# Patient Record
Sex: FEMALE | Race: WHITE | URBAN - METROPOLITAN AREA
[De-identification: names, ages, dates, MRNs, and addresses within clinical notes are randomized per-mention and may not be internally consistent; named-entity substitution may affect disease eponyms.]

---

## 2017-07-18 ENCOUNTER — OUTPATIENT (OUTPATIENT)
Dept: OUTPATIENT SERVICES | Facility: HOSPITAL | Age: 72
LOS: 1 days | Discharge: HOME | End: 2017-07-18
Payer: MEDICARE

## 2017-07-18 VITALS
RESPIRATION RATE: 14 BRPM | TEMPERATURE: 98 F | WEIGHT: 190.92 LBS | HEIGHT: 63 IN | DIASTOLIC BLOOD PRESSURE: 62 MMHG | SYSTOLIC BLOOD PRESSURE: 112 MMHG | HEART RATE: 70 BPM | OXYGEN SATURATION: 100 %

## 2017-07-18 DIAGNOSIS — Z01.818 ENCOUNTER FOR OTHER PREPROCEDURAL EXAMINATION: ICD-10-CM

## 2017-07-18 DIAGNOSIS — M48.06 SPINAL STENOSIS, LUMBAR REGION: ICD-10-CM

## 2017-07-18 DIAGNOSIS — M41.86 OTHER FORMS OF SCOLIOSIS, LUMBAR REGION: ICD-10-CM

## 2017-07-18 DIAGNOSIS — Z98.890 OTHER SPECIFIED POSTPROCEDURAL STATES: Chronic | ICD-10-CM

## 2017-07-18 DIAGNOSIS — G62.9 POLYNEUROPATHY, UNSPECIFIED: ICD-10-CM

## 2017-07-18 DIAGNOSIS — E89.0 POSTPROCEDURAL HYPOTHYROIDISM: Chronic | ICD-10-CM

## 2017-07-18 DIAGNOSIS — M41.9 SCOLIOSIS, UNSPECIFIED: ICD-10-CM

## 2017-07-18 DIAGNOSIS — Z98.49 CATARACT EXTRACTION STATUS, UNSPECIFIED EYE: Chronic | ICD-10-CM

## 2017-07-18 DIAGNOSIS — K21.9 GASTRO-ESOPHAGEAL REFLUX DISEASE WITHOUT ESOPHAGITIS: ICD-10-CM

## 2017-07-18 DIAGNOSIS — K76.0 FATTY (CHANGE OF) LIVER, NOT ELSEWHERE CLASSIFIED: ICD-10-CM

## 2017-07-18 DIAGNOSIS — Z92.241 PERSONAL HISTORY OF SYSTEMIC STEROID THERAPY: Chronic | ICD-10-CM

## 2017-07-18 DIAGNOSIS — K44.9 DIAPHRAGMATIC HERNIA WITHOUT OBSTRUCTION OR GANGRENE: ICD-10-CM

## 2017-07-18 DIAGNOSIS — M51.36 OTHER INTERVERTEBRAL DISC DEGENERATION, LUMBAR REGION: ICD-10-CM

## 2017-07-18 DIAGNOSIS — M43.16 SPONDYLOLISTHESIS, LUMBAR REGION: ICD-10-CM

## 2017-07-18 LAB
ALBUMIN SERPL ELPH-MCNC: 4.4 G/DL — SIGNIFICANT CHANGE UP (ref 3.3–5)
ALLERGY+IMMUNOLOGY DIAG STUDY NOTE: SIGNIFICANT CHANGE UP
ALP SERPL-CCNC: 92 U/L — SIGNIFICANT CHANGE UP (ref 40–120)
ALT FLD-CCNC: 30 U/L — SIGNIFICANT CHANGE UP (ref 12–78)
ANION GAP SERPL CALC-SCNC: 8 MMOL/L — SIGNIFICANT CHANGE UP (ref 5–17)
ANTIBODY INTERPRETATION 2: SIGNIFICANT CHANGE UP
APPEARANCE UR: CLEAR — SIGNIFICANT CHANGE UP
APTT BLD: 30.4 SEC — SIGNIFICANT CHANGE UP (ref 27.5–37.4)
AST SERPL-CCNC: 24 U/L — SIGNIFICANT CHANGE UP (ref 15–37)
BACTERIA # UR AUTO: (no result)
BASOPHILS # BLD AUTO: 0.1 K/UL — SIGNIFICANT CHANGE UP (ref 0–0.2)
BASOPHILS NFR BLD AUTO: 1.2 % — SIGNIFICANT CHANGE UP (ref 0–2)
BILIRUB SERPL-MCNC: 0.4 MG/DL — SIGNIFICANT CHANGE UP (ref 0.2–1.2)
BILIRUB UR-MCNC: NEGATIVE — SIGNIFICANT CHANGE UP
BLD GP AB SCN SERPL QL: (no result)
BUN SERPL-MCNC: 16 MG/DL — SIGNIFICANT CHANGE UP (ref 7–23)
CALCIUM SERPL-MCNC: 10.1 MG/DL — SIGNIFICANT CHANGE UP (ref 8.5–10.1)
CHLORIDE SERPL-SCNC: 106 MMOL/L — SIGNIFICANT CHANGE UP (ref 96–108)
CO2 SERPL-SCNC: 26 MMOL/L — SIGNIFICANT CHANGE UP (ref 22–31)
COLOR SPEC: YELLOW — SIGNIFICANT CHANGE UP
CREAT SERPL-MCNC: 0.98 MG/DL — SIGNIFICANT CHANGE UP (ref 0.5–1.3)
DIFF PNL FLD: NEGATIVE — SIGNIFICANT CHANGE UP
DIR ANTIGLOB POLYSPECIFIC INTERPRETATION: SIGNIFICANT CHANGE UP
EOSINOPHIL # BLD AUTO: 0.2 K/UL — SIGNIFICANT CHANGE UP (ref 0–0.5)
EOSINOPHIL NFR BLD AUTO: 2 % — SIGNIFICANT CHANGE UP (ref 0–6)
EPI CELLS # UR: SIGNIFICANT CHANGE UP
GLUCOSE SERPL-MCNC: 85 MG/DL — SIGNIFICANT CHANGE UP (ref 70–99)
GLUCOSE UR QL: NEGATIVE MG/DL — SIGNIFICANT CHANGE UP
HCT VFR BLD CALC: 41.7 % — SIGNIFICANT CHANGE UP (ref 34.5–45)
HGB BLD-MCNC: 14.7 G/DL — SIGNIFICANT CHANGE UP (ref 11.5–15.5)
INR BLD: 1 RATIO — SIGNIFICANT CHANGE UP (ref 0.88–1.16)
KETONES UR-MCNC: NEGATIVE — SIGNIFICANT CHANGE UP
LEUKOCYTE ESTERASE UR-ACNC: (no result)
LYMPHOCYTES # BLD AUTO: 3.2 K/UL — SIGNIFICANT CHANGE UP (ref 1–3.3)
LYMPHOCYTES # BLD AUTO: 32.7 % — SIGNIFICANT CHANGE UP (ref 13–44)
MCHC RBC-ENTMCNC: 32.2 PG — SIGNIFICANT CHANGE UP (ref 27–34)
MCHC RBC-ENTMCNC: 35.2 GM/DL — SIGNIFICANT CHANGE UP (ref 32–36)
MCV RBC AUTO: 91.5 FL — SIGNIFICANT CHANGE UP (ref 80–100)
MONOCYTES # BLD AUTO: 0.6 K/UL — SIGNIFICANT CHANGE UP (ref 0–0.9)
MONOCYTES NFR BLD AUTO: 5.8 % — SIGNIFICANT CHANGE UP (ref 2–14)
NEUTROPHILS # BLD AUTO: 5.6 K/UL — SIGNIFICANT CHANGE UP (ref 1.8–7.4)
NEUTROPHILS NFR BLD AUTO: 58.3 % — SIGNIFICANT CHANGE UP (ref 43–77)
NITRITE UR-MCNC: NEGATIVE — SIGNIFICANT CHANGE UP
PH UR: 5 — SIGNIFICANT CHANGE UP (ref 5–8)
PLATELET # BLD AUTO: 258 K/UL — SIGNIFICANT CHANGE UP (ref 150–400)
POTASSIUM SERPL-MCNC: 4.2 MMOL/L — SIGNIFICANT CHANGE UP (ref 3.5–5.3)
POTASSIUM SERPL-SCNC: 4.2 MMOL/L — SIGNIFICANT CHANGE UP (ref 3.5–5.3)
PROT SERPL-MCNC: 7.6 GM/DL — SIGNIFICANT CHANGE UP (ref 6–8.3)
PROT UR-MCNC: NEGATIVE MG/DL — SIGNIFICANT CHANGE UP
PROTHROM AB SERPL-ACNC: 10.8 SEC — SIGNIFICANT CHANGE UP (ref 9.8–12.7)
RBC # BLD: 4.56 M/UL — SIGNIFICANT CHANGE UP (ref 3.8–5.2)
RBC # FLD: 11 % — SIGNIFICANT CHANGE UP (ref 10.3–14.5)
RBC CASTS # UR COMP ASSIST: SIGNIFICANT CHANGE UP /HPF (ref 0–4)
SODIUM SERPL-SCNC: 140 MMOL/L — SIGNIFICANT CHANGE UP (ref 135–145)
SP GR SPEC: 1.02 — SIGNIFICANT CHANGE UP (ref 1.01–1.02)
TYPE + AB SCN PNL BLD: SIGNIFICANT CHANGE UP
UROBILINOGEN FLD QL: NEGATIVE MG/DL — SIGNIFICANT CHANGE UP
WBC # BLD: 9.7 K/UL — SIGNIFICANT CHANGE UP (ref 3.8–10.5)
WBC # FLD AUTO: 9.7 K/UL — SIGNIFICANT CHANGE UP (ref 3.8–10.5)
WBC UR QL: SIGNIFICANT CHANGE UP

## 2017-07-18 PROCEDURE — 86077 PHYS BLOOD BANK SERV XMATCH: CPT

## 2017-07-18 PROCEDURE — 93010 ELECTROCARDIOGRAM REPORT: CPT

## 2017-07-18 PROCEDURE — 71020: CPT | Mod: 26

## 2017-07-18 NOTE — H&P PST ADULT - PSH
S/P cataract extraction  B/L; 3/2017  S/P colonoscopy  usnure of all dates; last one 2014  S/P dilatation and curettage  2005  S/P endoscopy  usnure of all dates; last one 2016  S/P epidural steroid injection  2014, 2016  S/P partial thyroidectomy  1996

## 2017-07-18 NOTE — H&P PST ADULT - ASSESSMENT
This is a  52y /o  Female who presents to [facility] prior to proposed procedure with Dr. Atkins for L2-L5 laminectomies with posterolateral fusion possible instrumentation local bone graft.      1. Labs, MRSA swab, UA: per Dr. Atkins.  2. CXR to be reviewed by Radiology.  3. EKG to be reviewed by Cardiology.  4. Clearance with Dr. Oliva as scheduled, # [phone].  5. EZ sponges, Mupirocin ointment, day of procedure instructions provided and reviewed with patient.

## 2017-07-18 NOTE — H&P PST ADULT - HISTORY OF PRESENT ILLNESS
This is a  52y /o  Female who presents to [facility] prior to proposed procedure with Dr. Atkins for L2-L5 laminectomies with posterolateral fusion possible instrumentation local bone graft.  Reports lower back pain for many years and worsening since 2014. Reports has been having urgency of urination and bowels occasionally. Denies any urinary or fecal incontinence. Seen by Dr. Atkins with MRI of lower some which demonstrated sever spondylolisthesis, and spinal stenosis. Reports cramping of legs on and off and worse at night. Reports symptoms are decreased with Neurontin and Oxycodone as directed. Evaluated by Dr. Atkins and now presents for said procedure.

## 2017-07-19 LAB
HBA1C BLD-MCNC: 5.3 % — SIGNIFICANT CHANGE UP (ref 4–5.6)
MRSA PCR RESULT.: SIGNIFICANT CHANGE UP
S AUREUS DNA NOSE QL NAA+PROBE: SIGNIFICANT CHANGE UP

## 2017-07-26 LAB — ABO RH CONFIRMATION: SIGNIFICANT CHANGE UP

## 2017-07-26 RX ORDER — HYDROMORPHONE HYDROCHLORIDE 2 MG/ML
0.5 INJECTION INTRAMUSCULAR; INTRAVENOUS; SUBCUTANEOUS
Qty: 0 | Refills: 0 | Status: DISCONTINUED | OUTPATIENT
Start: 2017-07-27 | End: 2017-07-29

## 2017-07-26 RX ORDER — HYDROMORPHONE HYDROCHLORIDE 2 MG/ML
30 INJECTION INTRAMUSCULAR; INTRAVENOUS; SUBCUTANEOUS
Qty: 0 | Refills: 0 | Status: DISCONTINUED | OUTPATIENT
Start: 2017-07-27 | End: 2017-07-29

## 2017-07-26 RX ORDER — NALOXONE HYDROCHLORIDE 4 MG/.1ML
0.1 SPRAY NASAL
Qty: 0 | Refills: 0 | Status: DISCONTINUED | OUTPATIENT
Start: 2017-07-27 | End: 2017-07-31

## 2017-07-27 ENCOUNTER — INPATIENT (INPATIENT)
Facility: HOSPITAL | Age: 72
LOS: 3 days | Discharge: HOME | End: 2017-07-31
Attending: ORTHOPAEDIC SURGERY | Admitting: ORTHOPAEDIC SURGERY

## 2017-07-27 VITALS
TEMPERATURE: 98 F | DIASTOLIC BLOOD PRESSURE: 78 MMHG | WEIGHT: 190.48 LBS | RESPIRATION RATE: 16 BRPM | HEART RATE: 84 BPM | OXYGEN SATURATION: 97 % | SYSTOLIC BLOOD PRESSURE: 150 MMHG | HEIGHT: 63 IN

## 2017-07-27 DIAGNOSIS — Z98.890 OTHER SPECIFIED POSTPROCEDURAL STATES: Chronic | ICD-10-CM

## 2017-07-27 DIAGNOSIS — Z98.49 CATARACT EXTRACTION STATUS, UNSPECIFIED EYE: Chronic | ICD-10-CM

## 2017-07-27 DIAGNOSIS — E89.0 POSTPROCEDURAL HYPOTHYROIDISM: Chronic | ICD-10-CM

## 2017-07-27 DIAGNOSIS — Z92.241 PERSONAL HISTORY OF SYSTEMIC STEROID THERAPY: Chronic | ICD-10-CM

## 2017-07-27 LAB
ALLERGY+IMMUNOLOGY DIAG STUDY NOTE: (no result)
ANION GAP SERPL CALC-SCNC: 6 MMOL/L — SIGNIFICANT CHANGE UP (ref 5–17)
BASOPHILS # BLD AUTO: 0 K/UL — SIGNIFICANT CHANGE UP (ref 0–0.2)
BASOPHILS NFR BLD AUTO: 0.3 % — SIGNIFICANT CHANGE UP (ref 0–2)
BUN SERPL-MCNC: 11 MG/DL — SIGNIFICANT CHANGE UP (ref 7–23)
CALCIUM SERPL-MCNC: 9 MG/DL — SIGNIFICANT CHANGE UP (ref 8.5–10.1)
CHLORIDE SERPL-SCNC: 108 MMOL/L — SIGNIFICANT CHANGE UP (ref 96–108)
CO2 SERPL-SCNC: 27 MMOL/L — SIGNIFICANT CHANGE UP (ref 22–31)
CREAT SERPL-MCNC: 0.78 MG/DL — SIGNIFICANT CHANGE UP (ref 0.5–1.3)
EOSINOPHIL # BLD AUTO: 0 K/UL — SIGNIFICANT CHANGE UP (ref 0–0.5)
EOSINOPHIL NFR BLD AUTO: 0.3 % — SIGNIFICANT CHANGE UP (ref 0–6)
GLUCOSE SERPL-MCNC: 122 MG/DL — HIGH (ref 70–99)
HCT VFR BLD CALC: 38.7 % — SIGNIFICANT CHANGE UP (ref 34.5–45)
HGB BLD-MCNC: 13.4 G/DL — SIGNIFICANT CHANGE UP (ref 11.5–15.5)
LYMPHOCYTES # BLD AUTO: 1.4 K/UL — SIGNIFICANT CHANGE UP (ref 1–3.3)
LYMPHOCYTES # BLD AUTO: 12.3 % — LOW (ref 13–44)
MCHC RBC-ENTMCNC: 32.1 PG — SIGNIFICANT CHANGE UP (ref 27–34)
MCHC RBC-ENTMCNC: 34.8 GM/DL — SIGNIFICANT CHANGE UP (ref 32–36)
MCV RBC AUTO: 92.4 FL — SIGNIFICANT CHANGE UP (ref 80–100)
MONOCYTES # BLD AUTO: 0.3 K/UL — SIGNIFICANT CHANGE UP (ref 0–0.9)
MONOCYTES NFR BLD AUTO: 2.1 % — SIGNIFICANT CHANGE UP (ref 2–14)
NEUTROPHILS # BLD AUTO: 10 K/UL — HIGH (ref 1.8–7.4)
NEUTROPHILS NFR BLD AUTO: 85 % — HIGH (ref 43–77)
PLATELET # BLD AUTO: 230 K/UL — SIGNIFICANT CHANGE UP (ref 150–400)
POTASSIUM SERPL-MCNC: 4.1 MMOL/L — SIGNIFICANT CHANGE UP (ref 3.5–5.3)
POTASSIUM SERPL-SCNC: 4.1 MMOL/L — SIGNIFICANT CHANGE UP (ref 3.5–5.3)
RBC # BLD: 4.19 M/UL — SIGNIFICANT CHANGE UP (ref 3.8–5.2)
RBC # FLD: 11.2 % — SIGNIFICANT CHANGE UP (ref 10.3–14.5)
SODIUM SERPL-SCNC: 141 MMOL/L — SIGNIFICANT CHANGE UP (ref 135–145)
WBC # BLD: 11.8 K/UL — HIGH (ref 3.8–10.5)
WBC # FLD AUTO: 11.8 K/UL — HIGH (ref 3.8–10.5)

## 2017-07-27 RX ORDER — SIMVASTATIN 20 MG/1
20 TABLET, FILM COATED ORAL AT BEDTIME
Qty: 0 | Refills: 0 | Status: DISCONTINUED | OUTPATIENT
Start: 2017-07-27 | End: 2017-07-31

## 2017-07-27 RX ORDER — GABAPENTIN 400 MG/1
200 CAPSULE ORAL AT BEDTIME
Qty: 0 | Refills: 0 | Status: DISCONTINUED | OUTPATIENT
Start: 2017-07-27 | End: 2017-07-31

## 2017-07-27 RX ORDER — CHOLECALCIFEROL (VITAMIN D3) 125 MCG
1 CAPSULE ORAL
Qty: 0 | Refills: 0 | COMMUNITY

## 2017-07-27 RX ORDER — ONDANSETRON 8 MG/1
4 TABLET, FILM COATED ORAL EVERY 6 HOURS
Qty: 0 | Refills: 0 | Status: DISCONTINUED | OUTPATIENT
Start: 2017-07-27 | End: 2017-07-31

## 2017-07-27 RX ORDER — ONDANSETRON 8 MG/1
4 TABLET, FILM COATED ORAL ONCE
Qty: 0 | Refills: 0 | Status: DISCONTINUED | OUTPATIENT
Start: 2017-07-27 | End: 2017-07-27

## 2017-07-27 RX ORDER — MAGNESIUM OXIDE 400 MG ORAL TABLET 241.3 MG
1 TABLET ORAL
Qty: 0 | Refills: 0 | COMMUNITY

## 2017-07-27 RX ORDER — ONDANSETRON 8 MG/1
4 TABLET, FILM COATED ORAL EVERY 6 HOURS
Qty: 0 | Refills: 0 | Status: DISCONTINUED | OUTPATIENT
Start: 2017-07-27 | End: 2017-07-27

## 2017-07-27 RX ORDER — ACETAMINOPHEN 500 MG
1000 TABLET ORAL ONCE
Qty: 0 | Refills: 0 | Status: COMPLETED | OUTPATIENT
Start: 2017-07-27 | End: 2017-07-27

## 2017-07-27 RX ORDER — LEVOTHYROXINE SODIUM 125 MCG
1 TABLET ORAL
Qty: 0 | Refills: 0 | COMMUNITY

## 2017-07-27 RX ORDER — MAGNESIUM OXIDE 400 MG ORAL TABLET 241.3 MG
400 TABLET ORAL DAILY
Qty: 0 | Refills: 0 | Status: DISCONTINUED | OUTPATIENT
Start: 2017-07-27 | End: 2017-07-31

## 2017-07-27 RX ORDER — GABAPENTIN 400 MG/1
3 CAPSULE ORAL
Qty: 0 | Refills: 0 | COMMUNITY

## 2017-07-27 RX ORDER — RANITIDINE HYDROCHLORIDE 150 MG/1
1 TABLET, FILM COATED ORAL
Qty: 0 | Refills: 0 | COMMUNITY

## 2017-07-27 RX ORDER — SODIUM CHLORIDE 9 MG/ML
1000 INJECTION INTRAMUSCULAR; INTRAVENOUS; SUBCUTANEOUS
Qty: 0 | Refills: 0 | Status: DISCONTINUED | OUTPATIENT
Start: 2017-07-27 | End: 2017-07-27

## 2017-07-27 RX ORDER — FAMOTIDINE 10 MG/ML
20 INJECTION INTRAVENOUS DAILY
Qty: 0 | Refills: 0 | Status: DISCONTINUED | OUTPATIENT
Start: 2017-07-27 | End: 2017-07-28

## 2017-07-27 RX ORDER — CEFAZOLIN SODIUM 1 G
2000 VIAL (EA) INJECTION EVERY 8 HOURS
Qty: 0 | Refills: 0 | Status: COMPLETED | OUTPATIENT
Start: 2017-07-27 | End: 2017-07-28

## 2017-07-27 RX ORDER — DOCUSATE SODIUM 100 MG
100 CAPSULE ORAL THREE TIMES A DAY
Qty: 0 | Refills: 0 | Status: DISCONTINUED | OUTPATIENT
Start: 2017-07-27 | End: 2017-07-31

## 2017-07-27 RX ORDER — SODIUM CHLORIDE 9 MG/ML
1000 INJECTION, SOLUTION INTRAVENOUS
Qty: 0 | Refills: 0 | Status: DISCONTINUED | OUTPATIENT
Start: 2017-07-27 | End: 2017-07-31

## 2017-07-27 RX ORDER — GABAPENTIN 400 MG/1
300 CAPSULE ORAL
Qty: 0 | Refills: 0 | Status: DISCONTINUED | OUTPATIENT
Start: 2017-07-27 | End: 2017-07-27

## 2017-07-27 RX ORDER — OXYCODONE HYDROCHLORIDE 5 MG/1
1 TABLET ORAL
Qty: 0 | Refills: 0 | COMMUNITY

## 2017-07-27 RX ORDER — ACETAMINOPHEN 500 MG
325 TABLET ORAL EVERY 4 HOURS
Qty: 0 | Refills: 0 | Status: DISCONTINUED | OUTPATIENT
Start: 2017-07-27 | End: 2017-07-31

## 2017-07-27 RX ORDER — SIMVASTATIN 20 MG/1
1 TABLET, FILM COATED ORAL
Qty: 0 | Refills: 0 | COMMUNITY

## 2017-07-27 RX ORDER — LEVOTHYROXINE SODIUM 125 MCG
100 TABLET ORAL DAILY
Qty: 0 | Refills: 0 | Status: DISCONTINUED | OUTPATIENT
Start: 2017-07-27 | End: 2017-07-31

## 2017-07-27 RX ADMIN — ONDANSETRON 4 MILLIGRAM(S): 8 TABLET, FILM COATED ORAL at 21:32

## 2017-07-27 RX ADMIN — HYDROMORPHONE HYDROCHLORIDE 30 MILLILITER(S): 2 INJECTION INTRAMUSCULAR; INTRAVENOUS; SUBCUTANEOUS at 13:17

## 2017-07-27 RX ADMIN — Medication 100 MILLIGRAM(S): at 17:45

## 2017-07-27 RX ADMIN — HYDROMORPHONE HYDROCHLORIDE 0.5 MILLIGRAM(S): 2 INJECTION INTRAMUSCULAR; INTRAVENOUS; SUBCUTANEOUS at 13:32

## 2017-07-27 RX ADMIN — Medication 1000 MILLIGRAM(S): at 15:34

## 2017-07-27 RX ADMIN — Medication 400 MILLIGRAM(S): at 13:24

## 2017-07-27 NOTE — CONSULT NOTE ADULT - ASSESSMENT
Pt is a 73 y/o female with h/o hypothyroidism after partial thyroidectomy, hyperlipidemia, anxiety, DDD of lumbar spine with spinal stenosis, fatty liver, hiatal hernia with GERD who has been having increasing LBP since 2014. She was admitted for elective L2-L5 laminectomy with posterolateral fusion.  Post-op medicine consult called for comanagement.      * Lumbar Stenosis-s/p L2-L5 laminectomy with posterolateral fusion, continue pain control, PT when able, encourage use of incentive spirometry, monitor post-op labs.  * Hypothyroidism-continue synthroid  * GERD-continue zantac  * Hyperlipidemia-continue statins  * Neuropathy-continue Neurontin  * Proph- Venodyne while drain in place  * Comm- d/w pt and family in details, all questions answered.

## 2017-07-27 NOTE — BRIEF OPERATIVE NOTE - PRE-OP DX
Spinal stenosis of lumbar region at multiple levels  07/27/2017  L2-3 L3-4 and L4-5 spinal stenosis with DDD andmild acquired scoliosis  Active  ARTIE CORNEJO

## 2017-07-27 NOTE — BRIEF OPERATIVE NOTE - POST-OP DX
Spinal stenosis of lumbar region at multiple levels  07/27/2017  with DDD amd acquired scoliosis L2 to L5  Active  ARTIE CORNEJO

## 2017-07-27 NOTE — BRIEF OPERATIVE NOTE - PROCEDURE
Laminectomy at multiple levels  07/27/2017  L2 to L5 laminectomy and insitu posterolateral fusion local bone graft fluoroscopy neuromonitoring  Active  TDOWLING

## 2017-07-27 NOTE — CONSULT NOTE ADULT - SUBJECTIVE AND OBJECTIVE BOX
Patient is a 72y old  Female who presents with a chief complaint of " I had my surgery"      HPI: Pt is a 73 y/o female with h/o hypothyroidism after partial thyroidectomy, hyperlipidemia, anxiety, DDD of lumbar spine with spinal stenosis, fatty liver, hiatal hernia with GERD who has been having increasing LBP since 2014. She was admitted for elective L2-L5 laminectomy with posterolateral fusion.  Post-op medicine consult called for comanagement.  Pt seen and examined, chart reviewed, case d/w family.  Pt c/o LBP from surgery, no CP or SOB.      PAST MEDICAL & SURGICAL HISTORY:  Anxiety about health: upcoming spine surgery  Diverticulosis  Gallstones  Fatty liver  Lower back pain  Hypercholesterolemia  Hiatal hernia with GERD  Thyroid disorder: s/p partial thyroidectomy 1996  Spinal stenosis  DDD (degenerative disc disease), lumbar  Spondylisthesis: L2-L3  S/P colonoscopy: usnure of all dates; last one 2014  S/P endoscopy: usnure of all dates; last one 2016  S/P epidural steroid injection: 2014, 2016  S/P dilatation and curettage: 2005  S/P partial thyroidectomy: 1996  S/P cataract extraction: B/L; 3/2017      Allergies    Demerol HCl (Other)  phenylpropanolamine (Other)    Intolerances        MEDICATIONS  (STANDING):  sodium chloride 0.9%. 1000 milliLiter(s) (75 mL/Hr) IV Continuous <Continuous>  HYDROmorphone PCA (1 mG/mL) 30 milliLiter(s) PCA Continuous PCA Continuous  gabapentin 300 milliGRAM(s) Oral two times a day  simvastatin 20 milliGRAM(s) Oral at bedtime  magnesium oxide 400 milliGRAM(s) Oral daily  levothyroxine 100 MICROGram(s) Oral daily  lactated ringers. 1000 milliLiter(s) (100 mL/Hr) IV Continuous <Continuous>  ceFAZolin   IVPB 2000 milliGRAM(s) IV Intermittent every 8 hours  docusate sodium 100 milliGRAM(s) Oral three times a day    MEDICATIONS  (PRN):  ondansetron Injectable 4 milliGRAM(s) IV Push once PRN Nausea and/or Vomiting  HYDROmorphone PCA (1 mG/mL) Rescue Clinician Bolus 0.5 milliGRAM(s) IV Push every 15 minutes PRN for Pain Scale GREATER THAN 6  naloxone Injectable 0.1 milliGRAM(s) IV Push every 3 minutes PRN For ANY of the following changes in patient status:  A. RR LESS THAN 10 breaths per minute, B. Oxygen saturation LESS THAN 90%, C. Sedation score of 6  ondansetron Injectable 4 milliGRAM(s) IV Push every 6 hours PRN Nausea  acetaminophen   Tablet 325 milliGRAM(s) Oral every 4 hours PRN For Temp greater than 38.5 C (101.3 F)  ondansetron Injectable 4 milliGRAM(s) IV Push every 6 hours PRN Nausea      FAMILY HISTORY:  Family history of hypertension in father (Father)      Social History: , lives with , retired, social ETOH use, former smoker (smoked 2 ppd for 3 years), quit 2017      Vital Signs Last 24 Hrs  T(C): 36.6 (27 Jul 2017 15:20), Max: 36.7 (27 Jul 2017 08:29)  T(F): 97.8 (27 Jul 2017 15:20), Max: 98.1 (27 Jul 2017 08:29)  HR: 71 (27 Jul 2017 15:20) (70 - 94)  BP: 131/64 (27 Jul 2017 15:20) (110/76 - 157/69)  BP(mean): 79 (27 Jul 2017 15:20) (79 - 79)  RR: 16 (27 Jul 2017 15:20) (11 - 17)  SpO2: 93% (27 Jul 2017 15:20) (93% - 97%)    Daily Height in cm: 160.02 (27 Jul 2017 08:29)    Daily     I&O's Summary    27 Jul 2017 07:01  -  27 Jul 2017 15:34  --------------------------------------------------------  IN: 1900 mL / OUT: 415 mL / NET: 1485 mL          Data                          13.4   11.8  )-----------( 230      ( 27 Jul 2017 14:46 )             38.7       07-27    141  |  108  |  11  ----------------------------<  122<H>  4.1   |  27  |  0.78    Ca    9.0      27 Jul 2017 14:46

## 2017-07-27 NOTE — PATIENT PROFILE ADULT. - PMH
Anxiety about health  upcoming spine surgery  DDD (degenerative disc disease), lumbar    Diverticulosis    Fatty liver    Gallstones    Hiatal hernia with GERD    Hypercholesterolemia    Lower back pain    Spinal stenosis    Spondylisthesis  L2-L3  Thyroid disorder  s/p partial thyroidectomy 1996

## 2017-07-28 LAB
ANION GAP SERPL CALC-SCNC: 6 MMOL/L — SIGNIFICANT CHANGE UP (ref 5–17)
BASOPHILS # BLD AUTO: 0.1 K/UL — SIGNIFICANT CHANGE UP (ref 0–0.2)
BASOPHILS NFR BLD AUTO: 0.4 % — SIGNIFICANT CHANGE UP (ref 0–2)
BUN SERPL-MCNC: 10 MG/DL — SIGNIFICANT CHANGE UP (ref 7–23)
CALCIUM SERPL-MCNC: 8.9 MG/DL — SIGNIFICANT CHANGE UP (ref 8.5–10.1)
CHLORIDE SERPL-SCNC: 107 MMOL/L — SIGNIFICANT CHANGE UP (ref 96–108)
CO2 SERPL-SCNC: 27 MMOL/L — SIGNIFICANT CHANGE UP (ref 22–31)
CREAT SERPL-MCNC: 0.7 MG/DL — SIGNIFICANT CHANGE UP (ref 0.5–1.3)
EOSINOPHIL # BLD AUTO: 0 K/UL — SIGNIFICANT CHANGE UP (ref 0–0.5)
EOSINOPHIL NFR BLD AUTO: 0.2 % — SIGNIFICANT CHANGE UP (ref 0–6)
GLUCOSE SERPL-MCNC: 102 MG/DL — HIGH (ref 70–99)
HCT VFR BLD CALC: 37.3 % — SIGNIFICANT CHANGE UP (ref 34.5–45)
HGB BLD-MCNC: 12.9 G/DL — SIGNIFICANT CHANGE UP (ref 11.5–15.5)
LYMPHOCYTES # BLD AUTO: 16.4 % — SIGNIFICANT CHANGE UP (ref 13–44)
LYMPHOCYTES # BLD AUTO: 2.2 K/UL — SIGNIFICANT CHANGE UP (ref 1–3.3)
MCHC RBC-ENTMCNC: 32 PG — SIGNIFICANT CHANGE UP (ref 27–34)
MCHC RBC-ENTMCNC: 34.6 GM/DL — SIGNIFICANT CHANGE UP (ref 32–36)
MCV RBC AUTO: 92.5 FL — SIGNIFICANT CHANGE UP (ref 80–100)
MONOCYTES # BLD AUTO: 0.9 K/UL — SIGNIFICANT CHANGE UP (ref 0–0.9)
MONOCYTES NFR BLD AUTO: 6.9 % — SIGNIFICANT CHANGE UP (ref 2–14)
NEUTROPHILS # BLD AUTO: 10.1 K/UL — HIGH (ref 1.8–7.4)
NEUTROPHILS NFR BLD AUTO: 76 % — SIGNIFICANT CHANGE UP (ref 43–77)
PLATELET # BLD AUTO: 236 K/UL — SIGNIFICANT CHANGE UP (ref 150–400)
POTASSIUM SERPL-MCNC: 4.3 MMOL/L — SIGNIFICANT CHANGE UP (ref 3.5–5.3)
POTASSIUM SERPL-SCNC: 4.3 MMOL/L — SIGNIFICANT CHANGE UP (ref 3.5–5.3)
RBC # BLD: 4.03 M/UL — SIGNIFICANT CHANGE UP (ref 3.8–5.2)
RBC # FLD: 11.7 % — SIGNIFICANT CHANGE UP (ref 10.3–14.5)
SODIUM SERPL-SCNC: 140 MMOL/L — SIGNIFICANT CHANGE UP (ref 135–145)
WBC # BLD: 13.3 K/UL — HIGH (ref 3.8–10.5)
WBC # FLD AUTO: 13.3 K/UL — HIGH (ref 3.8–10.5)

## 2017-07-28 RX ORDER — FAMOTIDINE 10 MG/ML
20 INJECTION INTRAVENOUS
Qty: 0 | Refills: 0 | Status: DISCONTINUED | OUTPATIENT
Start: 2017-07-28 | End: 2017-07-31

## 2017-07-28 RX ADMIN — FAMOTIDINE 20 MILLIGRAM(S): 10 INJECTION INTRAVENOUS at 18:10

## 2017-07-28 RX ADMIN — Medication 100 MILLIGRAM(S): at 22:18

## 2017-07-28 RX ADMIN — GABAPENTIN 200 MILLIGRAM(S): 400 CAPSULE ORAL at 22:18

## 2017-07-28 RX ADMIN — SIMVASTATIN 20 MILLIGRAM(S): 20 TABLET, FILM COATED ORAL at 22:18

## 2017-07-28 RX ADMIN — Medication 100 MILLIGRAM(S): at 06:13

## 2017-07-28 RX ADMIN — Medication 100 MILLIGRAM(S): at 01:11

## 2017-07-28 RX ADMIN — ONDANSETRON 4 MILLIGRAM(S): 8 TABLET, FILM COATED ORAL at 11:53

## 2017-07-28 RX ADMIN — SODIUM CHLORIDE 100 MILLILITER(S): 9 INJECTION, SOLUTION INTRAVENOUS at 01:12

## 2017-07-28 RX ADMIN — SODIUM CHLORIDE 100 MILLILITER(S): 9 INJECTION, SOLUTION INTRAVENOUS at 11:52

## 2017-07-28 RX ADMIN — Medication 100 MICROGRAM(S): at 06:13

## 2017-07-28 RX ADMIN — Medication 100 MILLIGRAM(S): at 14:13

## 2017-07-28 NOTE — PHYSICAL THERAPY INITIAL EVALUATION ADULT - DIAGNOSIS, PT EVAL
Spinal stenosis of lumbar region at multiple levels with DDD and acquired scoliosis L2 to L5, s/p Laminectomy at multiple levels  07/27/2017  L2 to L5 laminectomy and insitu posterolateral fusion local bone graft

## 2017-07-28 NOTE — PHYSICAL THERAPY INITIAL EVALUATION ADULT - GENERAL OBSERVATIONS, REHAB EVAL
Pt rec'd supine in bed, cooperative with PT, reports she is uncomfortable in the bed wishes to change position.

## 2017-07-28 NOTE — PHYSICAL THERAPY INITIAL EVALUATION ADULT - PERTINENT HX OF CURRENT PROBLEM, REHAB EVAL
73 y/o female with h/o hypothyroidism after partial thyroidectomy, hyperlipidemia, anxiety, DDD of lumbar spine with spinal stenosis, fatty liver, hiatal hernia with GERD who has been having increasing LBP since 2014. She was admitted for elective L2-L5 laminectomy with posterolateral fusion.

## 2017-07-28 NOTE — PHYSICAL THERAPY INITIAL EVALUATION ADULT - DID THE PATIENT HAVE SURGERY?
s/p Laminectomy at multiple levels  07/27/2017  L2 to L5 laminectomy and insitu posterolateral fusion local bone graft with Dr. Atkinson/yes

## 2017-07-29 LAB
ANION GAP SERPL CALC-SCNC: 6 MMOL/L — SIGNIFICANT CHANGE UP (ref 5–17)
BASOPHILS # BLD AUTO: 0.1 K/UL — SIGNIFICANT CHANGE UP (ref 0–0.2)
BASOPHILS NFR BLD AUTO: 0.6 % — SIGNIFICANT CHANGE UP (ref 0–2)
BUN SERPL-MCNC: 8 MG/DL — SIGNIFICANT CHANGE UP (ref 7–23)
CALCIUM SERPL-MCNC: 9.1 MG/DL — SIGNIFICANT CHANGE UP (ref 8.5–10.1)
CHLORIDE SERPL-SCNC: 103 MMOL/L — SIGNIFICANT CHANGE UP (ref 96–108)
CO2 SERPL-SCNC: 29 MMOL/L — SIGNIFICANT CHANGE UP (ref 22–31)
CREAT SERPL-MCNC: 0.77 MG/DL — SIGNIFICANT CHANGE UP (ref 0.5–1.3)
EOSINOPHIL # BLD AUTO: 0.1 K/UL — SIGNIFICANT CHANGE UP (ref 0–0.5)
EOSINOPHIL NFR BLD AUTO: 1 % — SIGNIFICANT CHANGE UP (ref 0–6)
GLUCOSE SERPL-MCNC: 99 MG/DL — SIGNIFICANT CHANGE UP (ref 70–99)
HCT VFR BLD CALC: 35.7 % — SIGNIFICANT CHANGE UP (ref 34.5–45)
HGB BLD-MCNC: 12.2 G/DL — SIGNIFICANT CHANGE UP (ref 11.5–15.5)
LYMPHOCYTES # BLD AUTO: 19.8 % — SIGNIFICANT CHANGE UP (ref 13–44)
LYMPHOCYTES # BLD AUTO: 2.3 K/UL — SIGNIFICANT CHANGE UP (ref 1–3.3)
MCHC RBC-ENTMCNC: 32 PG — SIGNIFICANT CHANGE UP (ref 27–34)
MCHC RBC-ENTMCNC: 34 GM/DL — SIGNIFICANT CHANGE UP (ref 32–36)
MCV RBC AUTO: 94.1 FL — SIGNIFICANT CHANGE UP (ref 80–100)
MONOCYTES # BLD AUTO: 1 K/UL — HIGH (ref 0–0.9)
MONOCYTES NFR BLD AUTO: 8.9 % — SIGNIFICANT CHANGE UP (ref 2–14)
NEUTROPHILS # BLD AUTO: 8.1 K/UL — HIGH (ref 1.8–7.4)
NEUTROPHILS NFR BLD AUTO: 69.7 % — SIGNIFICANT CHANGE UP (ref 43–77)
PLATELET # BLD AUTO: 217 K/UL — SIGNIFICANT CHANGE UP (ref 150–400)
POTASSIUM SERPL-MCNC: 4.4 MMOL/L — SIGNIFICANT CHANGE UP (ref 3.5–5.3)
POTASSIUM SERPL-SCNC: 4.4 MMOL/L — SIGNIFICANT CHANGE UP (ref 3.5–5.3)
RBC # BLD: 3.8 M/UL — SIGNIFICANT CHANGE UP (ref 3.8–5.2)
RBC # FLD: 11.6 % — SIGNIFICANT CHANGE UP (ref 10.3–14.5)
SODIUM SERPL-SCNC: 138 MMOL/L — SIGNIFICANT CHANGE UP (ref 135–145)
WBC # BLD: 11.6 K/UL — HIGH (ref 3.8–10.5)
WBC # FLD AUTO: 11.6 K/UL — HIGH (ref 3.8–10.5)

## 2017-07-29 RX ORDER — OXYCODONE AND ACETAMINOPHEN 5; 325 MG/1; MG/1
2 TABLET ORAL EVERY 4 HOURS
Qty: 0 | Refills: 0 | Status: DISCONTINUED | OUTPATIENT
Start: 2017-07-29 | End: 2017-07-31

## 2017-07-29 RX ORDER — HYDROMORPHONE HYDROCHLORIDE 2 MG/ML
2 INJECTION INTRAMUSCULAR; INTRAVENOUS; SUBCUTANEOUS EVERY 6 HOURS
Qty: 0 | Refills: 0 | Status: DISCONTINUED | OUTPATIENT
Start: 2017-07-29 | End: 2017-07-31

## 2017-07-29 RX ADMIN — MAGNESIUM OXIDE 400 MG ORAL TABLET 400 MILLIGRAM(S): 241.3 TABLET ORAL at 12:31

## 2017-07-29 RX ADMIN — Medication 100 MICROGRAM(S): at 06:04

## 2017-07-29 RX ADMIN — OXYCODONE AND ACETAMINOPHEN 2 TABLET(S): 5; 325 TABLET ORAL at 17:39

## 2017-07-29 RX ADMIN — OXYCODONE AND ACETAMINOPHEN 2 TABLET(S): 5; 325 TABLET ORAL at 12:31

## 2017-07-29 RX ADMIN — OXYCODONE AND ACETAMINOPHEN 2 TABLET(S): 5; 325 TABLET ORAL at 22:20

## 2017-07-29 RX ADMIN — GABAPENTIN 200 MILLIGRAM(S): 400 CAPSULE ORAL at 21:18

## 2017-07-29 RX ADMIN — Medication 100 MILLIGRAM(S): at 06:04

## 2017-07-29 RX ADMIN — FAMOTIDINE 20 MILLIGRAM(S): 10 INJECTION INTRAVENOUS at 17:38

## 2017-07-29 RX ADMIN — FAMOTIDINE 20 MILLIGRAM(S): 10 INJECTION INTRAVENOUS at 06:04

## 2017-07-29 RX ADMIN — SIMVASTATIN 20 MILLIGRAM(S): 20 TABLET, FILM COATED ORAL at 21:22

## 2017-07-29 RX ADMIN — Medication 100 MILLIGRAM(S): at 21:18

## 2017-07-30 LAB
ANION GAP SERPL CALC-SCNC: 5 MMOL/L — SIGNIFICANT CHANGE UP (ref 5–17)
BASOPHILS # BLD AUTO: 0.1 K/UL — SIGNIFICANT CHANGE UP (ref 0–0.2)
BASOPHILS NFR BLD AUTO: 0.7 % — SIGNIFICANT CHANGE UP (ref 0–2)
BUN SERPL-MCNC: 10 MG/DL — SIGNIFICANT CHANGE UP (ref 7–23)
CALCIUM SERPL-MCNC: 9.4 MG/DL — SIGNIFICANT CHANGE UP (ref 8.5–10.1)
CHLORIDE SERPL-SCNC: 105 MMOL/L — SIGNIFICANT CHANGE UP (ref 96–108)
CO2 SERPL-SCNC: 29 MMOL/L — SIGNIFICANT CHANGE UP (ref 22–31)
CREAT SERPL-MCNC: 0.7 MG/DL — SIGNIFICANT CHANGE UP (ref 0.5–1.3)
EOSINOPHIL # BLD AUTO: 0.3 K/UL — SIGNIFICANT CHANGE UP (ref 0–0.5)
EOSINOPHIL NFR BLD AUTO: 2.4 % — SIGNIFICANT CHANGE UP (ref 0–6)
GLUCOSE SERPL-MCNC: 103 MG/DL — HIGH (ref 70–99)
HCT VFR BLD CALC: 33.3 % — LOW (ref 34.5–45)
HGB BLD-MCNC: 11.4 G/DL — LOW (ref 11.5–15.5)
LYMPHOCYTES # BLD AUTO: 2.9 K/UL — SIGNIFICANT CHANGE UP (ref 1–3.3)
LYMPHOCYTES # BLD AUTO: 27.5 % — SIGNIFICANT CHANGE UP (ref 13–44)
MCHC RBC-ENTMCNC: 31.8 PG — SIGNIFICANT CHANGE UP (ref 27–34)
MCHC RBC-ENTMCNC: 34.1 GM/DL — SIGNIFICANT CHANGE UP (ref 32–36)
MCV RBC AUTO: 93.2 FL — SIGNIFICANT CHANGE UP (ref 80–100)
MONOCYTES # BLD AUTO: 0.8 K/UL — SIGNIFICANT CHANGE UP (ref 0–0.9)
MONOCYTES NFR BLD AUTO: 7.8 % — SIGNIFICANT CHANGE UP (ref 2–14)
NEUTROPHILS # BLD AUTO: 6.6 K/UL — SIGNIFICANT CHANGE UP (ref 1.8–7.4)
NEUTROPHILS NFR BLD AUTO: 61.6 % — SIGNIFICANT CHANGE UP (ref 43–77)
PLATELET # BLD AUTO: 218 K/UL — SIGNIFICANT CHANGE UP (ref 150–400)
POTASSIUM SERPL-MCNC: 3.7 MMOL/L — SIGNIFICANT CHANGE UP (ref 3.5–5.3)
POTASSIUM SERPL-SCNC: 3.7 MMOL/L — SIGNIFICANT CHANGE UP (ref 3.5–5.3)
RBC # BLD: 3.58 M/UL — LOW (ref 3.8–5.2)
RBC # FLD: 11.6 % — SIGNIFICANT CHANGE UP (ref 10.3–14.5)
SODIUM SERPL-SCNC: 139 MMOL/L — SIGNIFICANT CHANGE UP (ref 135–145)
WBC # BLD: 10.7 K/UL — HIGH (ref 3.8–10.5)
WBC # FLD AUTO: 10.7 K/UL — HIGH (ref 3.8–10.5)

## 2017-07-30 RX ORDER — HEPARIN SODIUM 5000 [USP'U]/ML
5000 INJECTION INTRAVENOUS; SUBCUTANEOUS EVERY 12 HOURS
Qty: 0 | Refills: 0 | Status: DISCONTINUED | OUTPATIENT
Start: 2017-07-30 | End: 2017-07-31

## 2017-07-30 RX ADMIN — FAMOTIDINE 20 MILLIGRAM(S): 10 INJECTION INTRAVENOUS at 05:28

## 2017-07-30 RX ADMIN — OXYCODONE AND ACETAMINOPHEN 2 TABLET(S): 5; 325 TABLET ORAL at 18:42

## 2017-07-30 RX ADMIN — OXYCODONE AND ACETAMINOPHEN 2 TABLET(S): 5; 325 TABLET ORAL at 22:55

## 2017-07-30 RX ADMIN — MAGNESIUM OXIDE 400 MG ORAL TABLET 400 MILLIGRAM(S): 241.3 TABLET ORAL at 11:11

## 2017-07-30 RX ADMIN — Medication 100 MILLIGRAM(S): at 21:31

## 2017-07-30 RX ADMIN — OXYCODONE AND ACETAMINOPHEN 2 TABLET(S): 5; 325 TABLET ORAL at 05:28

## 2017-07-30 RX ADMIN — OXYCODONE AND ACETAMINOPHEN 2 TABLET(S): 5; 325 TABLET ORAL at 11:12

## 2017-07-30 RX ADMIN — SIMVASTATIN 20 MILLIGRAM(S): 20 TABLET, FILM COATED ORAL at 21:31

## 2017-07-30 RX ADMIN — HEPARIN SODIUM 5000 UNIT(S): 5000 INJECTION INTRAVENOUS; SUBCUTANEOUS at 17:37

## 2017-07-30 RX ADMIN — FAMOTIDINE 20 MILLIGRAM(S): 10 INJECTION INTRAVENOUS at 17:35

## 2017-07-30 RX ADMIN — Medication 100 MICROGRAM(S): at 05:28

## 2017-07-30 RX ADMIN — GABAPENTIN 200 MILLIGRAM(S): 400 CAPSULE ORAL at 21:31

## 2017-07-30 RX ADMIN — Medication 100 MILLIGRAM(S): at 17:35

## 2017-07-30 RX ADMIN — Medication 100 MILLIGRAM(S): at 05:28

## 2017-07-30 NOTE — PROGRESS NOTE ADULT - SUBJECTIVE AND OBJECTIVE BOX
[facility]                                                           Orthopedic Spine Progress Note      POST OPERATIVE DAY #: 3 S/P laminectomies L2-5 institute fusion         SUBJECTIVE: Patient seen and examined.  resting comfortable    Pain (0-10):   Current Pain Management:  [ ] PCA   [ ] Po Analgesics [ ] IM /IV Anagesics     Vital Signs Last 24 Hrs  T(C): 37.7 (29 Jul 2017 23:42), Max: 37.7 (29 Jul 2017 23:42)  T(F): 99.8 (29 Jul 2017 23:42), Max: 99.8 (29 Jul 2017 23:42)  HR: 79 (29 Jul 2017 23:42) (79 - 92)  BP: 128/46 (29 Jul 2017 23:42) (125/55 - 128/46)  BP(mean): 68 (29 Jul 2017 15:15) (68 - 68)  RR: 16 (29 Jul 2017 23:42) (16 - 16)  SpO2: 98% (29 Jul 2017 23:42) (93% - 98%)  I&O's Detail      OBJECTIVE:         Wound /Dressing:  changed,  clean and dry  Abd soft non tender  Neuro intact upper lower    Neurological: A/O x               Sensation: [ ] intact to light touch  [ ] decreased:          Motor exam: [  ]          [ ] Upper extremity    Delt      Bicp       Tri      Wrist ext  Wrst Flex       Digit Ext Digit Flex                                     R         5/5        5/5        5/5       5/5            5/5            5/5       5/5          5/5                                     L          5/5        5/5        5/5       5/5            5/5            5/5       5/5          5/5         [ ] Lower ext.     Hip Flx  Hip Ext   Hip Abd  Hip Add Quad   Hamstrg   TA       EHL      GS                              R        5/5        5/5        5/5             5/5        5/5        5/5        5/5     5/5      5/5                              L         5/5        5/5        5/5             5/5        5/5        5/5        5/5     5/5      5/5                                                        [ ] Vascular :             Tension Signs:           Long Tract Findings:     RADIOLOGY & ADDITIONAL STUDIES:                                               LABS:                        11.4   10.7  )-----------( 218      ( 30 Jul 2017 05:56 )             33.3     07-30    139  |  105  |  10  ----------------------------<  103<H>  3.7   |  29  |  0.70    Ca    9.4      30 Jul 2017 05:56      POD # 3 S/P laminectomies L2-5  fusion   - Patient seen and examined,  sitting up  in her chair, brace in in good spirits,  Pain tolerable with po analgesics  - She has been up walking with physical therapy.   - Original OR bandage changed,  wound looks good.,   drain removed,  - Neuro remains full and intact   - Labs normalized  - Continue to mobilize with PT,  doing well ,  likely d/c in am tomorrow

## 2017-07-31 ENCOUNTER — TRANSCRIPTION ENCOUNTER (OUTPATIENT)
Age: 72
End: 2017-07-31

## 2017-07-31 VITALS
SYSTOLIC BLOOD PRESSURE: 116 MMHG | HEART RATE: 85 BPM | TEMPERATURE: 99 F | DIASTOLIC BLOOD PRESSURE: 52 MMHG | RESPIRATION RATE: 16 BRPM | OXYGEN SATURATION: 96 %

## 2017-07-31 DIAGNOSIS — M48.00 SPINAL STENOSIS, SITE UNSPECIFIED: ICD-10-CM

## 2017-07-31 RX ORDER — OXYCODONE HYDROCHLORIDE 5 MG/1
2 TABLET ORAL
Qty: 0 | Refills: 0 | COMMUNITY
Start: 2017-07-31

## 2017-07-31 RX ADMIN — Medication 100 MILLIGRAM(S): at 06:11

## 2017-07-31 RX ADMIN — FAMOTIDINE 20 MILLIGRAM(S): 10 INJECTION INTRAVENOUS at 06:11

## 2017-07-31 RX ADMIN — OXYCODONE AND ACETAMINOPHEN 2 TABLET(S): 5; 325 TABLET ORAL at 08:28

## 2017-07-31 RX ADMIN — OXYCODONE AND ACETAMINOPHEN 2 TABLET(S): 5; 325 TABLET ORAL at 09:30

## 2017-07-31 RX ADMIN — Medication 100 MICROGRAM(S): at 06:11

## 2017-07-31 RX ADMIN — HEPARIN SODIUM 5000 UNIT(S): 5000 INJECTION INTRAVENOUS; SUBCUTANEOUS at 06:11

## 2017-07-31 NOTE — DISCHARGE NOTE ADULT - FINDINGS/TREATMENT
Doing well - ambulating, tolerating pain with PO medications. Afebrile. Wound looks good. Good LE strength.

## 2017-07-31 NOTE — DISCHARGE NOTE ADULT - MEDICATION SUMMARY - MEDICATIONS TO TAKE
I will START or STAY ON the medications listed below when I get home from the hospital:    acetaminophen-oxycodone 325 mg-5 mg oral tablet  -- 2 tab(s) by mouth every 4 hours, As needed, Moderate Pain (4 - 6)  -- Indication: For Spinal stenosis    Neurontin 100 mg oral capsule  -- 3 cap(s) by mouth 2 times a day  -- Indication: For Spinal stenosis    Zocor 20 mg oral tablet  -- 1 tab(s) by mouth once a day (at bedtime)  -- Indication: For PCP    Zantac 150 oral tablet  -- 1 tab(s) by mouth 2 times a day  -- Indication: For PCP    magnesium oxide  -- 1 tab(s) by mouth once a day  -- Indication: For PCP    Synthroid 100 mcg (0.1 mg) oral tablet  -- 1 tab(s) by mouth once a day  -- Indication: For PCP

## 2017-07-31 NOTE — DISCHARGE NOTE ADULT - PLAN OF CARE
reduce pain, increase function No bending lifting twisting or driving until further notice.    Call office for fever of 101 or more, drainage from or redness and swelling to the wound.   You may shower. Do not let the water beat directly onto the wound. Pat dry the area gently but well. No baths or soaking until cleared by surgeon   No NSAIDS For 2 months as they inhibit fusion.

## 2017-07-31 NOTE — DISCHARGE NOTE ADULT - PATIENT PORTAL LINK FT
“You can access the [facility] Patient Portal, offered by [facility], by registering with the following website: [email or web address]”

## 2017-07-31 NOTE — DISCHARGE NOTE ADULT - HOSPITAL COURSE
Patient is doing well pod 5 after her L2-5 laminectomy and fusion.   She has been ambulating well, is afebrile and is voiding.  She is tolerating PO medications and diet.   She is ready to Discharge home.

## 2017-07-31 NOTE — PROGRESS NOTE ADULT - PROVIDER SPECIALTY LIST ADULT
Internal Medicine
Orthopedics

## 2017-07-31 NOTE — DISCHARGE NOTE ADULT - NS AS ACTIVITY OBS
Do not drive or operate machinery/No Heavy lifting/straining/Showering allowed/no bathing or soaking

## 2017-07-31 NOTE — DISCHARGE NOTE ADULT - CARE PLAN
Principal Discharge DX:	DDD (degenerative disc disease), lumbar  Goal:	reduce pain, increase function  Instructions for follow-up, activity and diet:	No bending lifting twisting or driving until further notice.    Call office for fever of 101 or more, drainage from or redness and swelling to the wound.   You may shower. Do not let the water beat directly onto the wound. Pat dry the area gently but well. No baths or soaking until cleared by surgeon   No NSAIDS For 2 months as they inhibit fusion.

## 2017-07-31 NOTE — PROGRESS NOTE ADULT - ASSESSMENT
A/P;  D/C home today.   She has pain medications at home.
A/P: s/p L2-5 laminectomy/fusion - stable  1.  Continue PCA/harvey  2.  PT with LSO brace ordered
Pt is a 71 y/o female with h/o hypothyroidism after partial thyroidectomy, hyperlipidemia, anxiety, DDD of lumbar spine with spinal stenosis, fatty liver, hiatal hernia with GERD who has been having increasing LBP since 2014. She was admitted for elective L2-L5 laminectomy with posterolateral fusion.  Post-op medicine consult called for comanagement.      * Lumbar Stenosis-s/p L2-L5 laminectomy with posterolateral fusion, PAIN CONTROL, HEMOVAC OUT - START SQ HEPARIN, PT, encourage use of incentive spirometry  * Hypothyroidism-continue synthroid  * Leukocytosis-due to stress from surgery, monitor for now. RESOLVING  * GERD-continue pepcid  * Hyperlipidemia-continue statins  * Neuropathy-continue Neurontin  * Proph- VenodyneS, START SQ HEPARIN  * Comm- d/w pt, RN
Pt is a 71 y/o female with h/o hypothyroidism after partial thyroidectomy, hyperlipidemia, anxiety, DDD of lumbar spine with spinal stenosis, fatty liver, hiatal hernia with GERD who has been having increasing LBP since 2014. She was admitted for elective L2-L5 laminectomy with posterolateral fusion.  Post-op medicine consult called for comanagement.      * Lumbar Stenosis-s/p L2-L5 laminectomy with posterolateral fusion, PAIN COTNROL, WOULD DC RUSS, MONITOR HEMOVAC OUTPUT, PT, encourage use of incentive spirometry  * Hypothyroidism-continue synthroid  * Leukocytosis-due to stress from surgery, monitor for now.  * GERD-continue pepcid  * Hyperlipidemia-continue statins  * Neuropathy-continue Neurontin  * Proph- Venodyne while drain in place  * Comm- d/w pt, RN
s/p laminectomy/fusion : stable. d/c harvey and PCA. mobilize. d/c drain in place.
Pt is a 71 y/o female with h/o hypothyroidism after partial thyroidectomy, hyperlipidemia, anxiety, DDD of lumbar spine with spinal stenosis, fatty liver, hiatal hernia with GERD who has been having increasing LBP since 2014. She was admitted for elective L2-L5 laminectomy with posterolateral fusion.  Post-op medicine consult called for comanagement.      * Lumbar Stenosis-s/p L2-L5 laminectomy with posterolateral fusion, continue pain control via pca, PT, encourage use of incentive spirometry, monitor post-op labs.    * Hypothyroidism-continue synthroid  * Leukocytosis-due to stress from surgery, monitor for now.  * GERD-continue pepcid  * Hyperlipidemia-continue statins  * Neuropathy-continue Neurontin  * Proph- Venodyne while drain in place  * Comm- d/w pt, RN
Pt is a 73 y/o female with h/o hypothyroidism after partial thyroidectomy, hyperlipidemia, anxiety, DDD of lumbar spine with spinal stenosis, fatty liver, hiatal hernia with GERD who has been having increasing LBP since 2014. She was admitted for elective L2-L5 laminectomy with posterolateral fusion.  Post-op medicine consult called for comanagement.      * Lumbar Stenosis-s/p L2-L5 laminectomy with posterolateral fusion, continue pain control, PT, encourage use of incentive spirometry.  * Hypothyroidism-continue synthroid  * Leukocytosis-due to stress from surgery, resolved.  * GERD-continue pepcid  * Hyperlipidemia-continue statins  * Neuropathy-continue Neurontin  * Proph- heparin  * Comm- d/w pt, RN  * Disp-continue PT, d/c planning per spine surgery, pt is medically stable for d/c.

## 2017-07-31 NOTE — PROGRESS NOTE ADULT - SUBJECTIVE AND OBJECTIVE BOX
[facility]                                                           Orthopedic Spine Progress Note                                                               POST OPERATIVE DAY #1  STATUS POST: s/p L2-5 laminectomy/fusion    Pre-Op Dx: Spinal stenosis of lumbar region at multiple levels    Post-Op Dx:  Spinal stenosis of lumbar region at multiple levels    SUBJECTIVE: Patient seen and examined, awake/alert, had a rough night - confused about using PCA, reinstructed this am and pain better controlled now    Pain (0-10): 9  Current Pain Management:  [x] PCA   [ ] Po Analgesics [ ] IM /IV Anagesics     Vital Signs Last 24 Hrs  T(C): 36.6 (28 Jul 2017 07:35), Max: 37.4 (28 Jul 2017 03:25)  T(F): 97.9 (28 Jul 2017 07:35), Max: 99.3 (28 Jul 2017 03:25)  HR: 70 (28 Jul 2017 07:35) (70 - 94)  BP: 119/50 (28 Jul 2017 07:35) (110/76 - 157/69)  BP(mean): 79 (27 Jul 2017 15:20) (79 - 79)  RR: 16 (28 Jul 2017 07:35) (11 - 17)  SpO2: 97% (28 Jul 2017 07:35) (93% - 99%)  I&O's Detail    27 Jul 2017 07:01  -  28 Jul 2017 07:00  --------------------------------------------------------  IN:    IV PiggyBack: 50 mL    lactated ringers.: 1155 mL    Oral Fluid: 200 mL    Other: 1800 mL    sodium chloride 0.9%: 100 mL  Total IN: 3305 mL    OUT:    Accordian: 140 mL    Drain: 80 mL    Indwelling Catheter - Urethral: 1575 mL    Other: 180 mL  Total OUT: 1975 mL    Total NET: 1330 mL          OBJECTIVE:       Wound /Dressing: clean, dry, intact, drains: 140/80cc - both drains kept  Cervical ROM: normal  Lumbar: ROM: not tested  Neurological: A/O x 3              Sensation: [x] intact to light touch  [ ] decreased:          Motor exam: [x]          [x] Upper extremity    Delt      Bicp       Tri      Wrist ext  Wrst Flex       Digit Ext Digit Flex                                     R         5/5        5/5        5/5       5/5            5/5            5/5       5/5          5/5                                     L          5/5        5/5        5/5       5/5            5/5            5/5       5/5          5/5         [x] Lower ext.     Hip Flx  Hip Ext   Hip Abd  Hip Add Quad   Hamstrg   TA       EHL      GS                           R        5/5        5/5        5/5         5/5        5/5        5/5       5/5       5/5      5/5                           L         5/5        5/5        5/5         5/5        5/5        5/5       5/5       5/5      5/5                                                        [x] Vascular: intact           Tension Signs: none          Long Tract Findings: none     RADIOLOGY & ADDITIONAL STUDIES:                                               LABS:                        13.4   11.8  )-----------( 230      ( 27 Jul 2017 14:46 )             38.7     07-28    140  |  107  |  10  ----------------------------<  102<H>  4.3   |  27  |  0.70    Ca    8.9      28 Jul 2017 06:07          Blood Culture: n/a  Wound Culture: n/a
POD 4  Seen and examined.    Doing well. Ambulating well and voiding.  Tolerating PO    PE: NAD AFeb  Moving LE well with good power.   Incision looks good, no erythema or swelling.    WBC normalizing H/H 11.4/33.3
7/29/17: Back pain controlled; no cp, sob, n/v/f/c    ROS: AS PER HPI OTHERWISE ALL SYSTEMS REVIEWED AND ARE NEGATIVE    PHYSICAL EXAM:  Vital Signs Last 24 Hrs  T(C): 37.3 (29 Jul 2017 04:45), Max: 37.8 (28 Jul 2017 23:36)  T(F): 99.2 (29 Jul 2017 04:45), Max: 100 (28 Jul 2017 23:36)  HR: 90 (29 Jul 2017 04:45) (85 - 96)  BP: 128/50 (29 Jul 2017 04:45) (117/58 - 128/50)  BP(mean): --  RR: 16 (29 Jul 2017 04:45) (16 - 16)  SpO2: 95% (29 Jul 2017 04:45) (94% - 98%)  Vital Signs Last 24 Hrs  T(C): 37.3 (29 Jul 2017 04:45), Max: 37.8 (28 Jul 2017 23:36)  T(F): 99.2 (29 Jul 2017 04:45), Max: 100 (28 Jul 2017 23:36)  HR: 90 (29 Jul 2017 04:45) (85 - 96)  BP: 128/50 (29 Jul 2017 04:45) (117/58 - 128/50)  BP(mean): --  RR: 16 (29 Jul 2017 04:45) (16 - 16)  SpO2: 95% (29 Jul 2017 04:45) (94% - 98%)    GEN: A and O, NAD, mood stable  HEENT:   NC/AT, EOMI, no oropharyngeal lesions, erythema, exudates, oral thrush    NECK:   supple,    CV:  +S1, +S2, regular, no murmurs or rubs    RESP:   lungs clear to auscultation bilaterally, no wheezing, rales, rhonchi, good air entry bilaterally    GI:  abdomen soft, non-tender, non-distended, normal BS, no bruits, no abdominal masses, no palpable masses    BACK: DRESSING C/D/I WITH HEMOVAC IN PLACE    RECTAL:  not examined    :  POS RUSS    MSK:   normal muscle tone, no atrophy, no rigidity, no contractions    EXT:   no clubbing, no cyanosis, no edema, no calf pain, swelling or erythema    VASCULAR:  pulses equal and symmetric in the upper and lower extremities    NEURO:  AAOX3, no focal neurological deficits, follows all commands, able to move extremities spontaneously    SKIN:  no ulcers, lesions or rashes    LABS:                            12.2   11.6  )-----------( 217      ( 29 Jul 2017 05:37 )             35.7     07-29    138  |  103  |  8   ----------------------------<  99  4.4   |  29  |  0.77    Ca    9.1      29 Jul 2017 05:37      MEDICATIONS  (STANDING):  simvastatin 20 milliGRAM(s) Oral at bedtime  magnesium oxide 400 milliGRAM(s) Oral daily  levothyroxine 100 MICROGram(s) Oral daily  lactated ringers. 1000 milliLiter(s) (100 mL/Hr) IV Continuous <Continuous>  docusate sodium 100 milliGRAM(s) Oral three times a day  gabapentin 200 milliGRAM(s) Oral at bedtime  famotidine    Tablet 20 milliGRAM(s) Oral two times a day before meals    MEDICATIONS  (PRN):  naloxone Injectable 0.1 milliGRAM(s) IV Push every 3 minutes PRN For ANY of the following changes in patient status:  A. RR LESS THAN 10 breaths per minute, B. Oxygen saturation LESS THAN 90%, C. Sedation score of 6  acetaminophen   Tablet 325 milliGRAM(s) Oral every 4 hours PRN For Temp greater than 38.5 C (101.3 F)  ondansetron Injectable 4 milliGRAM(s) IV Push every 6 hours PRN Nausea  HYDROmorphone  Injectable 2 milliGRAM(s) IntraMuscular every 6 hours PRN Severe Pain (7 - 10)  oxyCODONE    5 mG/acetaminophen 325 mG 2 Tablet(s) Oral every 4 hours PRN Moderate Pain (4 - 6)
7/29/17: Back pain controlled; no cp, sob, n/v/f/c  7/30/17: No cp, sob, slight nausea today; back pain controlled, wants to do stairs before discharge; no urinary issues  ROS: AS PER HPI OTHERWISE ALL SYSTEMS REVIEWED AND ARE NEGATIVE    PHYSICAL EXAM:  Vital Signs Last 24 Hrs  T(C): 37.7 (29 Jul 2017 23:42), Max: 37.7 (29 Jul 2017 23:42)  T(F): 99.8 (29 Jul 2017 23:42), Max: 99.8 (29 Jul 2017 23:42)  HR: 79 (29 Jul 2017 23:42) (79 - 92)  BP: 128/46 (29 Jul 2017 23:42) (125/55 - 128/46)  BP(mean): 68 (29 Jul 2017 15:15) (68 - 68)  RR: 16 (29 Jul 2017 23:42) (16 - 16)  SpO2: 98% (29 Jul 2017 23:42) (93% - 98%)    GEN: A and O, NAD, mood stable  HEENT:   NC/AT, EOMI, no oropharyngeal lesions    NECK:   supple,    CV:  +S1, +S2, regular, no murmurs or rubs    RESP:   lungs clear to auscultation bilaterally, no wheezing, rales, rhonchi, good air entry bilaterally    GI:  abdomen soft, non-tender, non-distended, normal BS, no bruits, no abdominal masses, no palpable masses    BACK: in brace currently, no hemovac    RECTAL:  not examined    :  NO RUSS    MSK:   normal muscle tone, no atrophy, no rigidity, no contractions    EXT:   no clubbing, no cyanosis, no edema, no calf pain, swelling or erythema    VASCULAR:  pulses equal and symmetric in the upper and lower extremities    NEURO:  AAOX3, no focal neurological deficits, follows all commands, able to move extremities spontaneously    SKIN:  no ulcers, lesions or rashes    LABS:                              11.4   10.7  )-----------( 218      ( 30 Jul 2017 05:56 )             33.3     07-30    139  |  105  |  10  ----------------------------<  103<H>  3.7   |  29  |  0.70    Ca    9.4      30 Jul 2017 05:56          MEDICATIONS  (STANDING):  simvastatin 20 milliGRAM(s) Oral at bedtime  magnesium oxide 400 milliGRAM(s) Oral daily  levothyroxine 100 MICROGram(s) Oral daily  lactated ringers. 1000 milliLiter(s) (100 mL/Hr) IV Continuous <Continuous>  docusate sodium 100 milliGRAM(s) Oral three times a day  gabapentin 200 milliGRAM(s) Oral at bedtime  famotidine    Tablet 20 milliGRAM(s) Oral two times a day before meals  heparin  Injectable 5000 Unit(s) SubCutaneous every 12 hours    MEDICATIONS  (PRN):  naloxone Injectable 0.1 milliGRAM(s) IV Push every 3 minutes PRN For ANY of the following changes in patient status:  A. RR LESS THAN 10 breaths per minute, B. Oxygen saturation LESS THAN 90%, C. Sedation score of 6  acetaminophen   Tablet 325 milliGRAM(s) Oral every 4 hours PRN For Temp greater than 38.5 C (101.3 F)  ondansetron Injectable 4 milliGRAM(s) IV Push every 6 hours PRN Nausea  HYDROmorphone  Injectable 2 milliGRAM(s) IntraMuscular every 6 hours PRN Severe Pain (7 - 10)  oxyCODONE    5 mG/acetaminophen 325 mG 2 Tablet(s) Oral every 4 hours PRN Moderate Pain (4 - 6)
Patient reports some back pain. no radiating leg pain, no paresthesias.     PE dressing c/d/i  drain in place  motor 5/5 b/l LE  sensation intact L3-S1    no calf tenderness b/l LE
Pt c/o LBP from surgery overnight, no CP or SOB.  No new complaints.    Vital Signs Last 24 Hrs  T(C): 36.6 (28 Jul 2017 07:35), Max: 37.4 (28 Jul 2017 03:25)  T(F): 97.9 (28 Jul 2017 07:35), Max: 99.3 (28 Jul 2017 03:25)  HR: 70 (28 Jul 2017 07:35) (70 - 94)  BP: 119/50 (28 Jul 2017 07:35) (110/76 - 157/69)  BP(mean): 79 (27 Jul 2017 15:20) (79 - 79)  RR: 16 (28 Jul 2017 07:35) (11 - 17)  SpO2: 97% (28 Jul 2017 07:35) (93% - 99%)    Daily     Daily     I&O's Detail    27 Jul 2017 07:01  -  28 Jul 2017 07:00  --------------------------------------------------------  IN:    IV PiggyBack: 50 mL    lactated ringers.: 1155 mL    Oral Fluid: 200 mL    Other: 1800 mL    sodium chloride 0.9%: 100 mL  Total IN: 3305 mL    OUT:    Accordian: 140 mL    Drain: 80 mL    Indwelling Catheter - Urethral: 1575 mL    Other: 180 mL  Total OUT: 1975 mL    Total NET: 1330 mL          CAPILLARY BLOOD GLUCOSE                                          12.9   13.3  )-----------( 236      ( 28 Jul 2017 06:07 )             37.3       07-28    140  |  107  |  10  ----------------------------<  102<H>  4.3   |  27  |  0.70    Ca    8.9      28 Jul 2017 06:07                        MEDICATIONS  (STANDING):  HYDROmorphone PCA (1 mG/mL) 30 milliLiter(s) PCA Continuous PCA Continuous  simvastatin 20 milliGRAM(s) Oral at bedtime  magnesium oxide 400 milliGRAM(s) Oral daily  levothyroxine 100 MICROGram(s) Oral daily  lactated ringers. 1000 milliLiter(s) (100 mL/Hr) IV Continuous <Continuous>  docusate sodium 100 milliGRAM(s) Oral three times a day  famotidine    Tablet 20 milliGRAM(s) Oral daily  gabapentin 200 milliGRAM(s) Oral at bedtime    MEDICATIONS  (PRN):  HYDROmorphone PCA (1 mG/mL) Rescue Clinician Bolus 0.5 milliGRAM(s) IV Push every 15 minutes PRN for Pain Scale GREATER THAN 6  naloxone Injectable 0.1 milliGRAM(s) IV Push every 3 minutes PRN For ANY of the following changes in patient status:  A. RR LESS THAN 10 breaths per minute, B. Oxygen saturation LESS THAN 90%, C. Sedation score of 6  acetaminophen   Tablet 325 milliGRAM(s) Oral every 4 hours PRN For Temp greater than 38.5 C (101.3 F)  ondansetron Injectable 4 milliGRAM(s) IV Push every 6 hours PRN Nausea
Pt with uneventful weekend, no new complaints, ambulated in halls with PT.    Vital Signs Last 24 Hrs  T(C): 37.2 (31 Jul 2017 07:01), Max: 37.4 (30 Jul 2017 23:33)  T(F): 99 (31 Jul 2017 07:01), Max: 99.4 (30 Jul 2017 23:33)  HR: 85 (31 Jul 2017 07:01) (85 - 97)  BP: 116/52 (31 Jul 2017 07:01) (113/53 - 117/50)  BP(mean): 68 (30 Jul 2017 15:48) (68 - 68)  RR: 16 (31 Jul 2017 07:01) (16 - 16)  SpO2: 96% (31 Jul 2017 07:01) (95% - 97%)    Daily     Daily     I&O's Detail    31 Jul 2017 07:01  -  31 Jul 2017 09:33  --------------------------------------------------------  IN:    Oral Fluid: 360 mL  Total IN: 360 mL    OUT:  Total OUT: 0 mL    Total NET: 360 mL          CAPILLARY BLOOD GLUCOSE                                          11.4   10.7  )-----------( 218      ( 30 Jul 2017 05:56 )             33.3       07-30    139  |  105  |  10  ----------------------------<  103<H>  3.7   |  29  |  0.70    Ca    9.4      30 Jul 2017 05:56                        MEDICATIONS  (STANDING):  simvastatin 20 milliGRAM(s) Oral at bedtime  magnesium oxide 400 milliGRAM(s) Oral daily  levothyroxine 100 MICROGram(s) Oral daily  lactated ringers. 1000 milliLiter(s) (100 mL/Hr) IV Continuous <Continuous>  docusate sodium 100 milliGRAM(s) Oral three times a day  gabapentin 200 milliGRAM(s) Oral at bedtime  famotidine    Tablet 20 milliGRAM(s) Oral two times a day before meals  heparin  Injectable 5000 Unit(s) SubCutaneous every 12 hours    MEDICATIONS  (PRN):  naloxone Injectable 0.1 milliGRAM(s) IV Push every 3 minutes PRN For ANY of the following changes in patient status:  A. RR LESS THAN 10 breaths per minute, B. Oxygen saturation LESS THAN 90%, C. Sedation score of 6  acetaminophen   Tablet 325 milliGRAM(s) Oral every 4 hours PRN For Temp greater than 38.5 C (101.3 F)  ondansetron Injectable 4 milliGRAM(s) IV Push every 6 hours PRN Nausea  HYDROmorphone  Injectable 2 milliGRAM(s) IntraMuscular every 6 hours PRN Severe Pain (7 - 10)  oxyCODONE    5 mG/acetaminophen 325 mG 2 Tablet(s) Oral every 4 hours PRN Moderate Pain (4 - 6)

## 2017-08-03 DIAGNOSIS — E06.3 AUTOIMMUNE THYROIDITIS: ICD-10-CM

## 2017-08-03 DIAGNOSIS — K44.9 DIAPHRAGMATIC HERNIA WITHOUT OBSTRUCTION OR GANGRENE: ICD-10-CM

## 2017-08-03 DIAGNOSIS — M43.16 SPONDYLOLISTHESIS, LUMBAR REGION: ICD-10-CM

## 2017-08-03 DIAGNOSIS — D72.829 ELEVATED WHITE BLOOD CELL COUNT, UNSPECIFIED: ICD-10-CM

## 2017-08-03 DIAGNOSIS — E78.00 PURE HYPERCHOLESTEROLEMIA, UNSPECIFIED: ICD-10-CM

## 2017-08-03 DIAGNOSIS — E89.0 POSTPROCEDURAL HYPOTHYROIDISM: ICD-10-CM

## 2017-08-03 DIAGNOSIS — M48.06 SPINAL STENOSIS, LUMBAR REGION: ICD-10-CM

## 2017-08-03 DIAGNOSIS — G62.9 POLYNEUROPATHY, UNSPECIFIED: ICD-10-CM

## 2017-08-03 DIAGNOSIS — M51.36 OTHER INTERVERTEBRAL DISC DEGENERATION, LUMBAR REGION: ICD-10-CM

## 2017-08-03 DIAGNOSIS — K21.9 GASTRO-ESOPHAGEAL REFLUX DISEASE WITHOUT ESOPHAGITIS: ICD-10-CM

## 2017-08-03 DIAGNOSIS — Z87.891 PERSONAL HISTORY OF NICOTINE DEPENDENCE: ICD-10-CM

## 2017-08-03 DIAGNOSIS — M41.86 OTHER FORMS OF SCOLIOSIS, LUMBAR REGION: ICD-10-CM

## 2017-08-03 DIAGNOSIS — E78.5 HYPERLIPIDEMIA, UNSPECIFIED: ICD-10-CM

## 2017-08-03 DIAGNOSIS — K76.0 FATTY (CHANGE OF) LIVER, NOT ELSEWHERE CLASSIFIED: ICD-10-CM

## 2018-07-16 PROBLEM — M43.10 SPONDYLOLISTHESIS, SITE UNSPECIFIED: Chronic | Status: ACTIVE | Noted: 2017-07-18

## 2021-07-20 ENCOUNTER — TRANSCRIPTION ENCOUNTER (OUTPATIENT)
Age: 76
End: 2021-07-20

## 2021-08-03 ENCOUNTER — TRANSCRIPTION ENCOUNTER (OUTPATIENT)
Age: 76
End: 2021-08-03

## 2022-01-05 ENCOUNTER — OUTPATIENT (OUTPATIENT)
Dept: OUTPATIENT SERVICES | Facility: HOSPITAL | Age: 77
LOS: 1 days | End: 2022-01-05
Payer: MEDICARE

## 2022-01-05 DIAGNOSIS — Z98.890 OTHER SPECIFIED POSTPROCEDURAL STATES: Chronic | ICD-10-CM

## 2022-01-05 DIAGNOSIS — Z98.49 CATARACT EXTRACTION STATUS, UNSPECIFIED EYE: Chronic | ICD-10-CM

## 2022-01-05 DIAGNOSIS — E89.0 POSTPROCEDURAL HYPOTHYROIDISM: Chronic | ICD-10-CM

## 2022-01-05 DIAGNOSIS — Z20.828 CONTACT WITH AND (SUSPECTED) EXPOSURE TO OTHER VIRAL COMMUNICABLE DISEASES: ICD-10-CM

## 2022-01-05 DIAGNOSIS — Z92.241 PERSONAL HISTORY OF SYSTEMIC STEROID THERAPY: Chronic | ICD-10-CM

## 2022-01-05 PROBLEM — K80.20 CALCULUS OF GALLBLADDER WITHOUT CHOLECYSTITIS WITHOUT OBSTRUCTION: Chronic | Status: ACTIVE | Noted: 2017-07-18

## 2022-01-05 PROBLEM — F41.8 OTHER SPECIFIED ANXIETY DISORDERS: Chronic | Status: ACTIVE | Noted: 2017-07-18

## 2022-01-05 PROBLEM — E07.9 DISORDER OF THYROID, UNSPECIFIED: Chronic | Status: ACTIVE | Noted: 2017-07-18

## 2022-01-05 PROBLEM — M51.36 OTHER INTERVERTEBRAL DISC DEGENERATION, LUMBAR REGION: Chronic | Status: ACTIVE | Noted: 2017-07-18

## 2022-01-05 PROBLEM — E78.00 PURE HYPERCHOLESTEROLEMIA, UNSPECIFIED: Chronic | Status: ACTIVE | Noted: 2017-07-18

## 2022-01-05 PROBLEM — K21.9 GASTRO-ESOPHAGEAL REFLUX DISEASE WITHOUT ESOPHAGITIS: Chronic | Status: ACTIVE | Noted: 2017-07-18

## 2022-01-05 PROBLEM — M54.5 LOW BACK PAIN: Chronic | Status: ACTIVE | Noted: 2017-07-18

## 2022-01-05 PROBLEM — M48.00 SPINAL STENOSIS, SITE UNSPECIFIED: Chronic | Status: ACTIVE | Noted: 2017-07-18

## 2022-01-05 PROBLEM — K76.0 FATTY (CHANGE OF) LIVER, NOT ELSEWHERE CLASSIFIED: Chronic | Status: ACTIVE | Noted: 2017-07-18

## 2022-01-05 PROBLEM — K57.90 DIVERTICULOSIS OF INTESTINE, PART UNSPECIFIED, WITHOUT PERFORATION OR ABSCESS WITHOUT BLEEDING: Chronic | Status: ACTIVE | Noted: 2017-07-18

## 2022-01-05 LAB — SARS-COV-2 RNA SPEC QL NAA+PROBE: DETECTED

## 2022-01-05 PROCEDURE — U0003: CPT

## 2022-01-05 PROCEDURE — C9803: CPT

## 2022-01-05 PROCEDURE — U0005: CPT

## 2022-01-06 DIAGNOSIS — Z20.828 CONTACT WITH AND (SUSPECTED) EXPOSURE TO OTHER VIRAL COMMUNICABLE DISEASES: ICD-10-CM

## 2025-01-09 NOTE — H&P PST ADULT - PMH
Anxiety about health  upcoming spine surgery  DDD (degenerative disc disease), lumbar    Diverticulosis    Fatty liver    Gallstones    Hiatal hernia with GERD    Hypercholesterolemia    Lower back pain    Spinal stenosis    Spondylisthesis  L2-L3  Thyroid disorder  s/p partial thyroidectomy 1996 denies

## 2025-01-16 NOTE — H&P PST ADULT - NEUROLOGICAL
Printed everything and faxed to [facility]. Called [facility] and informed him that they should have everything they need now but if still problems to let us know.   negative detailed exam